# Patient Record
Sex: MALE | Race: OTHER | NOT HISPANIC OR LATINO | URBAN - METROPOLITAN AREA
[De-identification: names, ages, dates, MRNs, and addresses within clinical notes are randomized per-mention and may not be internally consistent; named-entity substitution may affect disease eponyms.]

---
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
## 2021-04-14 ENCOUNTER — APPOINTMENT (OUTPATIENT)
Dept: INTERNAL MEDICINE | Facility: CLINIC | Age: 21
End: 2021-04-14

## 2021-05-08 NOTE — ED PEDIATRIC NURSE NOTE - CHIEF COMPLAINT
Interventional Radiology Progress Note    Patient: Brooks Good Date: 2021  : 1956 Attending: MD Yosvany Bennett MD   64 year old male   Diagnosis/Comorbidities/Complications:  Patient Active Problem List   Diagnosis   • S/P MVR (mitral valve replacement)   • S/P TVR (tricuspid valve repair)   • S/P CABG x 3   • Essential (primary) hypertension   • Atrial fibrillation, paroxysmal   • CAD (coronary artery disease)   • Xarelto anticoagulation   • Cardiomyopathy, ischemic (CMS/HCC)   • Elevated troponin   • Abnormal stress test   • Acute systolic CHF (congestive heart failure) (CMS/HCC)       History: 64 year old male with CAD s/p 21 CABG Mitral valve replacement, tricuspid repair PFO closure on DOPT, HTN, hepatic steatosis, admitted through the ED 21 with chest pain, fatigue. Post admission patient found to have elevated LFT, Tbili, HIDA scan demonstrated hepatocellular dysfunction, MRCP without obstruction, positive for biliary sludge. IR consulted for cholecystostomy drain placement.     Reason for Follow -up :    -- 21 PTC 10 fr drain placed (Dr. Cabrera)     Subjective: Draining over night. Color is dark brown and no longer bloody per patient. He is feeling better but continues to have abdominal pain.      Physical Exam:  General: NAD. Appears stated age. Well developed, well nourished   Neuro: AAOx3. Moves all extremities spontaneously.   HEENT: Atraumatic, normocephalic.   Chest: Breathing comfortably on RA. Appears unlabored.  Abdomen: Soft, rounded. NT/ND. Mild generalized tenderness. PTC to gravity bag, dark brown drainage. Gauze dressing C/D/I no drainage from insertion site.   Extremities: Warm, well perfused.  Skin: Warm, dry, intact.      Vital Last Value 24 Hour Range   Temperature 99.1 °F (37.3 °C) (21 05) Temp  Min: 98.5 °F (36.9 °C)  Max: 99.1 °F (37.3 °C)   Pulse 74 (21 0511) Pulse  Min: 74  Max: 86   Respiratory 20 (21) Resp  Min: 18   Max: 20   Non-Invasive  Blood Pressure 95/60 (05/08/21 0511) BP  Min: 95/60  Max: 105/60   Arterial  Blood Pressure   No data recorded   Pulse Oximetry 97 % (05/08/21 0511) SpO2  Min: 95 %  Max: 97 %     Laboratory Results:  Recent Labs     05/06/21  0530 05/07/21  0531 05/07/21  0916 05/07/21  1841 05/08/21  0501 05/08/21  0608   SODIUM 137  --   --   --   --   --    POTASSIUM 3.7 4.0  --   --  4.1  --    BUN 10  --   --   --   --   --    CREATININE 0.82  --   --   --   --   --    WBC 3.3*  --   --   --   --  6.1   HCT 31.3*  --  31.3* 31.5*  --  29.4*   HGB 10.1*  --  10.0* 9.8*  --  9.2*   *  --   --   --   --  125*   GLUCOSE 82  --   --   --   --   --    BILIRUBIN 1.8* 1.6*  --   --  1.1*  --      Above labs were reviewed.    Scheduled Medications:  Current Facility-Administered Medications   Medication Dose Route Frequency Provider Last Rate Last Admin   • [Held by provider] sodium chloride 0.9% infusion   Intravenous Continuous Yosvany Winkler MD   Stopped at 05/07/21 0852   • HYDROmorphone (DILAUDID) injection 0.6 mg  0.6 mg Intravenous Q2H PRN Yosvany Winkler MD       • oxyCODONE-acetaminophen (PERCOCET) 5-325 MG tablet 1 tablet  1 tablet Oral Q4H PRN Trudy Estrada, COLIN   1 tablet at 05/08/21 0530   • aspirin chewable 81 mg  81 mg Oral Daily Roc Lehman MD   81 mg at 05/07/21 0857   • atorvastatin (LIPITOR) tablet 80 mg  80 mg Oral Daily Roc Lehman MD   80 mg at 05/07/21 0857   • clopidogrel (PLAVIX) tablet 75 mg  75 mg Oral Daily Roc Lehman MD   75 mg at 05/07/21 0857   • [Held by provider] sacubitril-valsartan (ENTRESTO) 24-26 MG per tablet 1 tablet  1 tablet Oral BID Trudy Estrada NP   1 tablet at 05/03/21 0820   • metoPROLOL succinate (TOPROL-XL) ER tablet 25 mg  25 mg Oral BID Trudy Estrada NP   25 mg at 05/07/21 5624   • nitroGLYcerin (NITROSTAT) sublingual tablet 0.4 mg  0.4 mg Sublingual Q5 Min PRN Roc Lehman MD       • [Held by provider] rivaroxaban  (XARELTO) tablet 20 mg  20 mg Oral Daily Roc Lehman MD   20 mg at 05/03/21 0820   • sodium chloride (NORMAL SALINE) 0.9 % bolus 500 mL  500 mL Intravenous PRN Rco Lehman MD   Stopped at 05/03/21 1812   • sodium chloride 0.9 % flush bag 25 mL  25 mL Intravenous PRN Roc Lehman MD       • Potassium Standard Replacement Protocol   Does not apply See Admin Instructions Roc Lehman MD       • Magnesium Standard Replacement Protocol   Does not apply See Admin Instructions Roc Lehman MD       • ondansetron (ZOFRAN) injection 4 mg  4 mg Intravenous BID PRN Roc Lehman MD   4 mg at 05/06/21 1958   • prochlorperazine (COMPAZINE) injection 5 mg  5 mg Intravenous Q4H PRN Roc Lehman MD       • acetaminophen (TYLENOL) tablet 650 mg  650 mg Oral Q4H PRN Roc Lehman MD   650 mg at 05/05/21 2156   • polyethylene glycol (MIRALAX) packet 17 g  17 g Oral Daily PRN Roc Lehman MD       • bisacodyl (DULCOLAX) suppository 10 mg  10 mg Rectal Daily PRN Roc Lehman MD       • nitroGLYcerin topical (NITRO-BID) 2 % ointment 1 inch  1 inch Topical Q6H PRN Fransico Holland MD   1 inch at 05/03/21 0100   • ranolazine (RANEXA) 12 hr tablet 500 mg  500 mg Oral BID Ebony Noble PA-C   500 mg at 05/07/21 2143   • sodium chloride (NORMAL SALINE) 0.9 % bolus 250 mL  250 mL Intravenous Once Trudy Estrada NP       • piperacillin-tazobactam (ZOSYN) 3.375 g in sodium chloride 0.9 % 100 mL IVPB  3.375 g Intravenous 3 times per day Yosvany Winkler MD 25 mL/hr at 05/08/21 0535 3.375 g at 05/08/21 0535   • pantoprazole (PROTONIX) EC tablet 40 mg  40 mg Oral QAM AC Trudy Estrada NP   40 mg at 05/08/21 0527   • sodium chloride (PF) 0.9 % injection 2 mL  2 mL Intracatheter 2 times per day Roc Lehman MD   2 mL at 05/07/21 2148       Continuous Infusions:  • [Held by provider] sodium chloride 0.9% infusion Stopped (05/07/21 0852)       Intake/Output:  Date 05/07/21 0700 - 05/08/21 0659 05/08/21 0700 -  05/09/21 0659   Shift 8020-3829 0013-8225 1325-1579 24 Hour Total 6183-0331 1814-1292 1198-3947 24 Hour Total   INTAKE   P.O.   100 100       Shift Total   100 100       OUTPUT   Urine  450  450       Drains 150 225 50 425         Output (ml) (Drain 05/06/21 #1 Right Abdomen Bulb (e.g. MOLLY, Davol, etc)) 150 225 50 425       Shift Total 150 675 50 875       Weight (kg) 90.5 90.5 91.6 91.6 91.6 91.6 91.6 91.6       Imaging:  NM hepatobiliary 5/5/21    IMPRESSION:   1. Hepatocellular dysfunction with reduced and delayed radiotracer  excretion.     2. The common bile duct is patent.     3. The gallbladder is not visualized. This raises the possibility of cystic  duct obstruction but the specificity of this finding is reduced given the  limited radiotracer excretion by the liver. Additional delayed images could  be performed at 1500 today or tomorrow morning to increase the confidence  of this finding, if necessary. Please let nuclear medicine know if this is  desired.      Plan/Recommendations:  64 year old male with complex cardiac history as noted in HPI found to have acute cholecystitis s/p IR PTC drain placed 5/6/21   -- T.Bili 1.1, Alk phos 178   -- Maintain drain to gravity drainage, document outputs every 8 hours, do not flush, dressing changes daily or as needed.    -- Plan for repeat cholangiogram possible capping trial in 6 weeks, OP orders placed    -- General surgery following- no current surgical plan, high risk d/t DOPT    -- Consider restarting Xarelto 48 hrs post placement       PATRICK Azar  Vascular & Interventional Radiology  [phone]      Please call [phone] or contact the on-call IR provider for urgent issues outside of above hours.     The patient is a 16y Male complaining of vomiting.

## 2021-05-26 ENCOUNTER — APPOINTMENT (OUTPATIENT)
Dept: INTERNAL MEDICINE | Facility: CLINIC | Age: 21
End: 2021-05-26
Payer: MEDICAID

## 2021-05-26 VITALS
DIASTOLIC BLOOD PRESSURE: 72 MMHG | TEMPERATURE: 98.2 F | BODY MASS INDEX: 23.66 KG/M2 | HEIGHT: 65 IN | WEIGHT: 142 LBS | SYSTOLIC BLOOD PRESSURE: 110 MMHG | HEART RATE: 72 BPM | RESPIRATION RATE: 15 BRPM | OXYGEN SATURATION: 98 %

## 2021-05-26 DIAGNOSIS — E55.9 VITAMIN D DEFICIENCY, UNSPECIFIED: ICD-10-CM

## 2021-05-26 DIAGNOSIS — Z13.29 ENCOUNTER FOR SCREENING FOR OTHER SUSPECTED ENDOCRINE DISORDER: ICD-10-CM

## 2021-05-26 DIAGNOSIS — Z11.4 ENCOUNTER FOR SCREENING FOR HUMAN IMMUNODEFICIENCY VIRUS [HIV]: ICD-10-CM

## 2021-05-26 DIAGNOSIS — Z00.00 ENCOUNTER FOR GENERAL ADULT MEDICAL EXAMINATION W/OUT ABNORMAL FINDINGS: ICD-10-CM

## 2021-05-26 DIAGNOSIS — Z13.220 ENCOUNTER FOR SCREENING FOR LIPOID DISORDERS: ICD-10-CM

## 2021-05-26 DIAGNOSIS — R63.0 ANOREXIA: ICD-10-CM

## 2021-05-26 DIAGNOSIS — R05 COUGH: ICD-10-CM

## 2021-05-26 DIAGNOSIS — Z87.09 PERSONAL HISTORY OF OTHER DISEASES OF THE RESPIRATORY SYSTEM: ICD-10-CM

## 2021-05-26 DIAGNOSIS — Z13.1 ENCOUNTER FOR SCREENING FOR DIABETES MELLITUS: ICD-10-CM

## 2021-05-26 DIAGNOSIS — Z13.31 ENCOUNTER FOR SCREENING FOR DEPRESSION: ICD-10-CM

## 2021-05-26 DIAGNOSIS — Z83.79 FAMILY HISTORY OF OTHER DISEASES OF THE DIGESTIVE SYSTEM: ICD-10-CM

## 2021-05-26 DIAGNOSIS — Z78.9 OTHER SPECIFIED HEALTH STATUS: ICD-10-CM

## 2021-05-26 DIAGNOSIS — R06.2 WHEEZING: ICD-10-CM

## 2021-05-26 PROCEDURE — 99395 PREV VISIT EST AGE 18-39: CPT | Mod: 25

## 2021-05-26 PROCEDURE — 96127 BRIEF EMOTIONAL/BEHAV ASSMT: CPT

## 2021-05-26 RX ORDER — ALBUTEROL SULFATE 90 UG/1
108 (90 BASE) INHALANT RESPIRATORY (INHALATION)
Qty: 1 | Refills: 0 | Status: ACTIVE | COMMUNITY
Start: 2021-05-26 | End: 1900-01-01

## 2021-05-26 NOTE — ASSESSMENT
[FreeTextEntry1] : \par Intermittent cough/Wheezing\par -he may have mild asthma vs seasonal allergies\par -he will continue to use claritin 10mg daily prn\par -will add albuterol inhaler for prn use\par -will check CXR\par -will order labs\par -if sx persist he should notify office\par -if sx persist may benefit from seeing pulmonary for form PFts\par \par S/P covid 19\par -will check labs\par \par Vitamin D deficiency:\par -labs ordered today\par \par HCM:\par \par CPE 2021\par \par Depression screenin2021\par \par Flu shot: he did not get this past flu season\par \par Tdap: 2012\par \par Hepatitis A vaccine: completed series\par \par Hepatitis B vaccine: completed series\par \par HPV vaccine: completed series\par \par Covid vaccine advised\par \par HIV testing: offered 2021-he declined\par \par F/U 3 months for cough f/u.  Fasting labs drawn in office today\par

## 2021-05-26 NOTE — HEALTH RISK ASSESSMENT
[Yes] : In the past 12 months have you used drugs other than those required for medical reasons? Yes [0] : 2) Feeling down, depressed, or hopeless: Not at all (0) [HIV test declined] : HIV test declined [Employed] : employed [] : No [de-identified] : occasionally [de-identified] : marijuana [[id number]] : 0 [FreeTextEntry2] :

## 2021-05-26 NOTE — PHYSICAL EXAM
[No Acute Distress] : no acute distress [Well Nourished] : well nourished [Well Developed] : well developed [Well-Appearing] : well-appearing [Normal Voice/Communication] : normal voice/communication [Normal Sclera/Conjunctiva] : normal sclera/conjunctiva [PERRL] : pupils equal round and reactive to light [EOMI] : extraocular movements intact [Normal Outer Ear/Nose] : the outer ears and nose were normal in appearance [Normal Oropharynx] : the oropharynx was normal [Normal TMs] : both tympanic membranes were normal [Normal Nasal Mucosa] : the nasal mucosa was normal [No JVD] : no jugular venous distention [No Lymphadenopathy] : no lymphadenopathy [Supple] : supple [Thyroid Normal, No Nodules] : the thyroid was normal and there were no nodules present [No Respiratory Distress] : no respiratory distress  [No Accessory Muscle Use] : no accessory muscle use [Clear to Auscultation] : lungs were clear to auscultation bilaterally [Normal Rate] : normal rate  [Regular Rhythm] : with a regular rhythm [Normal S1, S2] : normal S1 and S2 [No Murmur] : no murmur heard [No Carotid Bruits] : no carotid bruits [No Edema] : there was no peripheral edema [No Extremity Clubbing/Cyanosis] : no extremity clubbing/cyanosis [Soft] : abdomen soft [Non Tender] : non-tender [Non-distended] : non-distended [No Masses] : no abdominal mass palpated [No HSM] : no HSM [Normal Bowel Sounds] : normal bowel sounds [No CVA Tenderness] : no CVA  tenderness [No Spinal Tenderness] : no spinal tenderness [No Joint Swelling] : no joint swelling [No Rash] : no rash [No Skin Lesions] : no skin lesions [No Focal Deficits] : no focal deficits [Normal Gait] : normal gait [Normal Affect] : the affect was normal [Alert and Oriented x3] : oriented to person, place, and time [Normal Mood] : the mood was normal [Normal Insight/Judgement] : insight and judgment were intact

## 2021-05-26 NOTE — HISTORY OF PRESENT ILLNESS
[de-identified] : \par Here for CPE\par \par He was previously followed by Dr Bazan\par \par He states he would like check up because he had covid 19 about 5 months ago.  he states sx were not severe.  he states ever since he had covid he gets an occasional mild dry cough.  he states sx lasts for about an hour and then go away.  he does have seasonal allergies and takes claritin every day.  he states he had asthma as a child.  he states when he gets coughing fits he does get wheezing from time to time

## 2021-05-26 NOTE — REVIEW OF SYSTEMS
[Wheezing] : wheezing [Cough] : cough [Negative] : Psychiatric [Fever] : no fever [Chills] : no chills [Fatigue] : no fatigue [Recent Change In Weight] : ~T no recent weight change [Palpitations] : no palpitations [Lower Ext Edema] : no lower extremity edema [Shortness Of Breath] : no shortness of breath [Dyspnea on Exertion] : not dyspnea on exertion [Abdominal Pain] : no abdominal pain [Nausea] : no nausea [Diarrhea] : no diarrhea [Vomiting] : no vomiting [Anxiety] : no anxiety [Depression] : no depression

## 2021-05-27 LAB
25(OH)D3 SERPL-MCNC: 24 NG/ML
ALBUMIN SERPL ELPH-MCNC: 5.3 G/DL
ALP BLD-CCNC: 96 U/L
ALT SERPL-CCNC: 11 U/L
ANION GAP SERPL CALC-SCNC: 12 MMOL/L
APPEARANCE: CLEAR
AST SERPL-CCNC: 17 U/L
BACTERIA: NEGATIVE
BASOPHILS # BLD AUTO: 0.06 K/UL
BASOPHILS NFR BLD AUTO: 1.2 %
BILIRUB SERPL-MCNC: 0.8 MG/DL
BILIRUBIN URINE: NEGATIVE
BLOOD URINE: NEGATIVE
BUN SERPL-MCNC: 11 MG/DL
CALCIUM SERPL-MCNC: 10 MG/DL
CHLORIDE SERPL-SCNC: 103 MMOL/L
CHOLEST SERPL-MCNC: 144 MG/DL
CO2 SERPL-SCNC: 27 MMOL/L
COLOR: YELLOW
CREAT SERPL-MCNC: 0.73 MG/DL
EOSINOPHIL # BLD AUTO: 0.09 K/UL
EOSINOPHIL NFR BLD AUTO: 1.8 %
ERYTHROCYTE [SEDIMENTATION RATE] IN BLOOD BY WESTERGREN METHOD: 3 MM/HR
ESTIMATED AVERAGE GLUCOSE: 105 MG/DL
GLUCOSE QUALITATIVE U: NEGATIVE
GLUCOSE SERPL-MCNC: 83 MG/DL
HBA1C MFR BLD HPLC: 5.3 %
HCT VFR BLD CALC: 49.2 %
HDLC SERPL-MCNC: 85 MG/DL
HGB BLD-MCNC: 15.3 G/DL
HYALINE CASTS: 2 /LPF
IMM GRANULOCYTES NFR BLD AUTO: 0.2 %
KETONES URINE: NEGATIVE
LDLC SERPL CALC-MCNC: 52 MG/DL
LEUKOCYTE ESTERASE URINE: NEGATIVE
LYMPHOCYTES # BLD AUTO: 1.55 K/UL
LYMPHOCYTES NFR BLD AUTO: 30.5 %
MAN DIFF?: NORMAL
MCHC RBC-ENTMCNC: 27.1 PG
MCHC RBC-ENTMCNC: 31.1 GM/DL
MCV RBC AUTO: 87.2 FL
MICROSCOPIC-UA: NORMAL
MONOCYTES # BLD AUTO: 0.34 K/UL
MONOCYTES NFR BLD AUTO: 6.7 %
NEUTROPHILS # BLD AUTO: 3.03 K/UL
NEUTROPHILS NFR BLD AUTO: 59.6 %
NITRITE URINE: NEGATIVE
NONHDLC SERPL-MCNC: 59 MG/DL
PH URINE: 6.5
PLATELET # BLD AUTO: 232 K/UL
POTASSIUM SERPL-SCNC: 4.6 MMOL/L
PROT SERPL-MCNC: 7.9 G/DL
PROTEIN URINE: NEGATIVE
RBC # BLD: 5.64 M/UL
RBC # FLD: 13.6 %
RED BLOOD CELLS URINE: 1 /HPF
SODIUM SERPL-SCNC: 141 MMOL/L
SPECIFIC GRAVITY URINE: 1.02
SQUAMOUS EPITHELIAL CELLS: 0 /HPF
T4 FREE SERPL-MCNC: 1.5 NG/DL
TRIGL SERPL-MCNC: 34 MG/DL
TSH SERPL-ACNC: 0.59 UIU/ML
UROBILINOGEN URINE: NORMAL
WBC # FLD AUTO: 5.08 K/UL
WHITE BLOOD CELLS URINE: 0 /HPF

## 2021-06-04 ENCOUNTER — NON-APPOINTMENT (OUTPATIENT)
Age: 21
End: 2021-06-04

## 2021-06-21 ENCOUNTER — NON-APPOINTMENT (OUTPATIENT)
Age: 21
End: 2021-06-21

## 2021-06-24 ENCOUNTER — INPATIENT (INPATIENT)
Facility: HOSPITAL | Age: 21
LOS: 0 days | Discharge: LEFT BEFORE DISCHARGE | End: 2021-06-25
Attending: STUDENT IN AN ORGANIZED HEALTH CARE EDUCATION/TRAINING PROGRAM | Admitting: STUDENT IN AN ORGANIZED HEALTH CARE EDUCATION/TRAINING PROGRAM
Payer: MEDICAID

## 2021-06-24 ENCOUNTER — EMERGENCY (EMERGENCY)
Facility: HOSPITAL | Age: 21
LOS: 1 days | Discharge: HOME | End: 2021-06-24
Attending: EMERGENCY MEDICINE | Admitting: EMERGENCY MEDICINE
Payer: MEDICAID

## 2021-06-24 VITALS
TEMPERATURE: 99 F | OXYGEN SATURATION: 100 % | SYSTOLIC BLOOD PRESSURE: 118 MMHG | HEART RATE: 74 BPM | DIASTOLIC BLOOD PRESSURE: 82 MMHG | RESPIRATION RATE: 16 BRPM

## 2021-06-24 VITALS
OXYGEN SATURATION: 99 % | SYSTOLIC BLOOD PRESSURE: 129 MMHG | TEMPERATURE: 98 F | RESPIRATION RATE: 16 BRPM | DIASTOLIC BLOOD PRESSURE: 85 MMHG | HEART RATE: 79 BPM

## 2021-06-24 VITALS
OXYGEN SATURATION: 100 % | SYSTOLIC BLOOD PRESSURE: 127 MMHG | TEMPERATURE: 98 F | DIASTOLIC BLOOD PRESSURE: 77 MMHG | HEART RATE: 88 BPM | RESPIRATION RATE: 18 BRPM

## 2021-06-24 LAB
ALBUMIN SERPL ELPH-MCNC: 5 G/DL — SIGNIFICANT CHANGE UP (ref 3.3–5)
ALP SERPL-CCNC: 73 U/L — SIGNIFICANT CHANGE UP (ref 40–120)
ALT FLD-CCNC: 12 U/L — SIGNIFICANT CHANGE UP (ref 4–41)
ANION GAP SERPL CALC-SCNC: 16 MMOL/L — HIGH (ref 7–14)
APPEARANCE UR: CLEAR — SIGNIFICANT CHANGE UP
AST SERPL-CCNC: 15 U/L — SIGNIFICANT CHANGE UP (ref 4–40)
BASOPHILS # BLD AUTO: 0.02 K/UL — SIGNIFICANT CHANGE UP (ref 0–0.2)
BASOPHILS NFR BLD AUTO: 0.2 % — SIGNIFICANT CHANGE UP (ref 0–2)
BILIRUB SERPL-MCNC: 0.8 MG/DL — SIGNIFICANT CHANGE UP (ref 0.2–1.2)
BILIRUB UR-MCNC: NEGATIVE — SIGNIFICANT CHANGE UP
BUN SERPL-MCNC: 10 MG/DL — SIGNIFICANT CHANGE UP (ref 7–23)
CALCIUM SERPL-MCNC: 9.5 MG/DL — SIGNIFICANT CHANGE UP (ref 8.4–10.5)
CHLORIDE SERPL-SCNC: 103 MMOL/L — SIGNIFICANT CHANGE UP (ref 98–107)
CO2 SERPL-SCNC: 23 MMOL/L — SIGNIFICANT CHANGE UP (ref 22–31)
COLOR SPEC: SIGNIFICANT CHANGE UP
CREAT SERPL-MCNC: 0.7 MG/DL — SIGNIFICANT CHANGE UP (ref 0.5–1.3)
DIFF PNL FLD: NEGATIVE — SIGNIFICANT CHANGE UP
EOSINOPHIL # BLD AUTO: 0 K/UL — SIGNIFICANT CHANGE UP (ref 0–0.5)
EOSINOPHIL NFR BLD AUTO: 0 % — SIGNIFICANT CHANGE UP (ref 0–6)
GLUCOSE SERPL-MCNC: 99 MG/DL — SIGNIFICANT CHANGE UP (ref 70–99)
GLUCOSE UR QL: NEGATIVE — SIGNIFICANT CHANGE UP
HCT VFR BLD CALC: 40.8 % — SIGNIFICANT CHANGE UP (ref 39–50)
HGB BLD-MCNC: 13.9 G/DL — SIGNIFICANT CHANGE UP (ref 13–17)
IANC: 7.73 K/UL — SIGNIFICANT CHANGE UP (ref 1.5–8.5)
IMM GRANULOCYTES NFR BLD AUTO: 0.2 % — SIGNIFICANT CHANGE UP (ref 0–1.5)
KETONES UR-MCNC: ABNORMAL
LEUKOCYTE ESTERASE UR-ACNC: NEGATIVE — SIGNIFICANT CHANGE UP
LIDOCAIN IGE QN: 40 U/L — SIGNIFICANT CHANGE UP (ref 7–60)
LYMPHOCYTES # BLD AUTO: 0.82 K/UL — LOW (ref 1–3.3)
LYMPHOCYTES # BLD AUTO: 9.1 % — LOW (ref 13–44)
MCHC RBC-ENTMCNC: 28 PG — SIGNIFICANT CHANGE UP (ref 27–34)
MCHC RBC-ENTMCNC: 34.1 GM/DL — SIGNIFICANT CHANGE UP (ref 32–36)
MCV RBC AUTO: 82.3 FL — SIGNIFICANT CHANGE UP (ref 80–100)
MONOCYTES # BLD AUTO: 0.42 K/UL — SIGNIFICANT CHANGE UP (ref 0–0.9)
MONOCYTES NFR BLD AUTO: 4.7 % — SIGNIFICANT CHANGE UP (ref 2–14)
NEUTROPHILS # BLD AUTO: 7.73 K/UL — HIGH (ref 1.8–7.4)
NEUTROPHILS NFR BLD AUTO: 85.8 % — HIGH (ref 43–77)
NITRITE UR-MCNC: NEGATIVE — SIGNIFICANT CHANGE UP
NRBC # BLD: 0 /100 WBCS — SIGNIFICANT CHANGE UP
NRBC # FLD: 0 K/UL — SIGNIFICANT CHANGE UP
PH UR: 7.5 — SIGNIFICANT CHANGE UP (ref 5–8)
PLATELET # BLD AUTO: 293 K/UL — SIGNIFICANT CHANGE UP (ref 150–400)
POTASSIUM SERPL-MCNC: 3.6 MMOL/L — SIGNIFICANT CHANGE UP (ref 3.5–5.3)
POTASSIUM SERPL-SCNC: 3.6 MMOL/L — SIGNIFICANT CHANGE UP (ref 3.5–5.3)
PROT SERPL-MCNC: 7.7 G/DL — SIGNIFICANT CHANGE UP (ref 6–8.3)
PROT UR-MCNC: ABNORMAL
RBC # BLD: 4.96 M/UL — SIGNIFICANT CHANGE UP (ref 4.2–5.8)
RBC # FLD: 12.9 % — SIGNIFICANT CHANGE UP (ref 10.3–14.5)
SODIUM SERPL-SCNC: 142 MMOL/L — SIGNIFICANT CHANGE UP (ref 135–145)
SP GR SPEC: >1.05 (ref 1.01–1.02)
UROBILINOGEN FLD QL: SIGNIFICANT CHANGE UP
WBC # BLD: 9.01 K/UL — SIGNIFICANT CHANGE UP (ref 3.8–10.5)
WBC # FLD AUTO: 9.01 K/UL — SIGNIFICANT CHANGE UP (ref 3.8–10.5)

## 2021-06-24 PROCEDURE — 99284 EMERGENCY DEPT VISIT MOD MDM: CPT

## 2021-06-24 PROCEDURE — 74177 CT ABD & PELVIS W/CONTRAST: CPT | Mod: 26

## 2021-06-24 RX ORDER — ACETAMINOPHEN 500 MG
975 TABLET ORAL ONCE
Refills: 0 | Status: COMPLETED | OUTPATIENT
Start: 2021-06-24 | End: 2021-06-24

## 2021-06-24 RX ORDER — SODIUM CHLORIDE 9 MG/ML
1000 INJECTION, SOLUTION INTRAVENOUS
Refills: 0 | Status: DISCONTINUED | OUTPATIENT
Start: 2021-06-24 | End: 2021-06-25

## 2021-06-24 RX ORDER — FAMOTIDINE 10 MG/ML
20 INJECTION INTRAVENOUS DAILY
Refills: 0 | Status: DISCONTINUED | OUTPATIENT
Start: 2021-06-24 | End: 2021-06-24

## 2021-06-24 RX ORDER — HALOPERIDOL DECANOATE 100 MG/ML
5 INJECTION INTRAMUSCULAR ONCE
Refills: 0 | Status: COMPLETED | OUTPATIENT
Start: 2021-06-24 | End: 2021-06-24

## 2021-06-24 RX ORDER — ONDANSETRON 8 MG/1
4 TABLET, FILM COATED ORAL ONCE
Refills: 0 | Status: COMPLETED | OUTPATIENT
Start: 2021-06-24 | End: 2021-06-24

## 2021-06-24 RX ORDER — SODIUM CHLORIDE 9 MG/ML
1000 INJECTION INTRAMUSCULAR; INTRAVENOUS; SUBCUTANEOUS ONCE
Refills: 0 | Status: COMPLETED | OUTPATIENT
Start: 2021-06-24 | End: 2021-06-24

## 2021-06-24 RX ORDER — ONDANSETRON 8 MG/1
4 TABLET, FILM COATED ORAL ONCE
Refills: 0 | Status: DISCONTINUED | OUTPATIENT
Start: 2021-06-24 | End: 2021-06-24

## 2021-06-24 RX ORDER — FAMOTIDINE 10 MG/ML
20 INJECTION INTRAVENOUS ONCE
Refills: 0 | Status: COMPLETED | OUTPATIENT
Start: 2021-06-24 | End: 2021-06-24

## 2021-06-24 RX ADMIN — SODIUM CHLORIDE 1000 MILLILITER(S): 9 INJECTION INTRAMUSCULAR; INTRAVENOUS; SUBCUTANEOUS at 18:38

## 2021-06-24 RX ADMIN — HALOPERIDOL DECANOATE 5 MILLIGRAM(S): 100 INJECTION INTRAMUSCULAR at 23:58

## 2021-06-24 RX ADMIN — Medication 975 MILLIGRAM(S): at 18:38

## 2021-06-24 RX ADMIN — FAMOTIDINE 20 MILLIGRAM(S): 10 INJECTION INTRAVENOUS at 23:59

## 2021-06-24 RX ADMIN — ONDANSETRON 4 MILLIGRAM(S): 8 TABLET, FILM COATED ORAL at 18:38

## 2021-06-24 NOTE — ED ADULT NURSE NOTE - NSIMPLEMENTINTERV_GEN_ALL_ED
Implemented All Universal Safety Interventions:  [place] to call system. Call bell, personal items and telephone within reach. Instruct patient to call for assistance. Room bathroom lighting operational. Non-slip footwear when patient is off stretcher. Physically safe environment: no spills, clutter or unnecessary equipment. Stretcher in lowest position, wheels locked, appropriate side rails in place.

## 2021-06-24 NOTE — ED PROVIDER NOTE - CARE PLAN
Principal Discharge DX:	Abdominal pain   Principal Discharge DX:	Abdominal pain  Secondary Diagnosis:	Dehydration

## 2021-06-24 NOTE — ED ADULT NURSE NOTE - OBJECTIVE STATEMENT
pt received at intake rm 10a AAO x 3. pt reports abdominal pain, n/v x few days. pt states [facility] and discharged with negative results. pt denies sob, chest pain, diarrhea, fevers, chills, cough. respirations even and unlabored. 20g iv placed to right ac. will continue to monitor.

## 2021-06-24 NOTE — ED PROVIDER NOTE - PATIENT PORTAL LINK FT
You can access the FollowMyHealth Patient Portal offered by [facility] by registering at the following website: [email or web address]. By joining [employer]’s FollowMyHealth portal, you will also be able to view your health information using other applications (apps) compatible with our system.

## 2021-06-24 NOTE — ED PROVIDER NOTE - PROGRESS NOTE DETAILS
pt is insisting on leaving [facility].  I spoke to him and his mother and he is still insisting on leaving.  right now his abd is soft no r/g and he is not vomiting.

## 2021-06-24 NOTE — ED PROVIDER NOTE - OBJECTIVE STATEMENT
22 Y/O M denies PMH states that he has been having nausea and vomiting since Sunday. Pt denies diarrhea, states he last vomited 2 hours prior to ED arrival. Pt states he had an endoscopy years ago which he states was normal. Pt states he has abdominal pain (worse in epigastrium but also present in RLQ) which is 5/10. Pt states he went to [facility] yesterday and had a CT (normal as per charge RN at [facility]) and was found to have a potassium of 3.3 and was D/C'd with GI follow up. Spoke to pt's mother who states she is concerned pt may be inducing vomiting, states pt has induced vomiting before and has lost a significant amount of weight. Pt states he drank alcohol on Sunday but does not drink regularly (6 shots of liquor). Pt is an occasional Marijuana smoker. Pt denies any other sx or acute complaints.

## 2021-06-24 NOTE — ED PROVIDER NOTE - ATTENDING CONTRIBUTION TO CARE
sharath: pt seen earlier tonight for intractable cyclic vomiting.  says he has a normal scope a few years ago, no answer found ct done yesterday at [facility], no abnl findings.  pt last smoked pot as per him 2 weeks ago.  now with gagging and vomiting in the ED.  pt had been dc, but ate in abp and got nauseated and returned.  abd soft no r/g/t.  pt offered admission.  pt has gi appt tomorrow but doesn't want to wait.  hydrating and giving antiemetics    I performed a history and physical exam of the patient and discussed their management with the resident and /or advanced care provider. I reviewed the resident and /or ACP's note and agree with the documented findings and plan of care. My medical decison making and observations are found above.

## 2021-06-24 NOTE — ED PROVIDER NOTE - NSFOLLOWUPINSTRUCTIONS_ED_ALL_ED_FT
Follow up with your Gastroenterologist as scheduled. If you do not have a Gastroenterologist call  to schedule an appointment.  Advance activity as tolerated.  Continue all previously prescribed medications as directed.  Follow up with your primary care physician in 48-72 hours- bring copies of your results.  Return to the ER for worsening or persistent symptoms, and/or ANY NEW OR CONCERNING SYMPTOMS. If you have issues obtaining follow up, please call: [phone] to obtain a doctor or specialist who takes your insurance in your area.  You may call [phone] to make an appointment with the internal medicine clinic.

## 2021-06-24 NOTE — ED ADULT NURSE NOTE - CHIEF COMPLAINT QUOTE
Pt with abdominal pain nausea and vomiting x few days was seen at [facility] and had negative ct. and negative blood work. pt returns again today for pain.

## 2021-06-24 NOTE — ED PROVIDER NOTE - CLINICAL SUMMARY MEDICAL DECISION MAKING FREE TEXT BOX
20 Y/O M denies PMH states that he has been having nausea and vomiting since Sunday. Pt denies diarrhea, states he last vomited 2 hours prior to ED arrival. Pt states he had an endoscopy years ago which he states was normal. Pt states he has abdominal pain (worse in epigastrium but also present in RLQ) which is 5/10. Plan is CT to eval for appendicitis or other acute abdominal findings. Labs ordered to eval for anemia or electrolyte disturbance. Pt is well appearing, no acute distress. 22 Y/O M denies PMH states that he has been having nausea and vomiting since Sunday. Pt denies diarrhea, states he last vomited 2 hours prior to ED arrival. Pt states he had an endoscopy years ago which he states was normal. Pt states he has abdominal pain (worse in epigastrium but also present in RLQ) which is 5/10. Plan is CT to eval for appendicitis or other acute abdominal findings. Labs ordered to eval for anemia or electrolyte disturbance. Pt is well appearing, no acute distress.    haughey: pt with intractable cyclic vomiting.  says he has a normal scope a few years ago, no answer found ct done yesterday at [facility], no abnl findings.  pt last smoked pot as per him 2 weeks ago.  now with gagging and vomiting in the ED.  patient and mom both very upset  abd soft no r/g/t.  pt has gi appt tomorrow but doesn't want to wait.  hydrating and giving antiemetics.  pt able to tolerate po after treatment and dc

## 2021-06-24 NOTE — ED PROVIDER NOTE - CLINICAL SUMMARY MEDICAL DECISION MAKING FREE TEXT BOX
20 Y/O M denies PMH states that he has been having nausea and vomiting since Sunday. Pt denies diarrhea, states he last vomited 2 hours prior to ED arrival. Pt states he had an endoscopy years ago which he states was normal. Pt returned to the ER C/O recurrent nausea and severe pain. Pt concerned about going home, states he cannot keep liquids down. Will admit to medicine and consult GI for further eval. 20 Y/O M denies PMH states that he has been having nausea and vomiting since Sunday. Pt denies diarrhea, states he last vomited 2 hours prior to ED arrival. Pt states he had an endoscopy years ago which he states was normal. Pt returned to the ER C/O recurrent nausea and severe pain. Pt concerned about going home, states he cannot keep liquids down. Will admit to medicine and consult GI for further eval.    donovanughtorito: pt seen earlier tonight for intractable cyclic vomiting.  says he has a normal scope a few years ago, no answer found ct done yesterday at [facility], no abnl findings.  pt last smoked pot as per him 2 weeks ago.  now with gagging and vomiting in the ED.  pt had been dc, but ate in abp and got nauseated and returned.  abd soft no r/g/t.  pt offered admission.  pt has gi appt tomorrow but doesn't want to wait.  hydrating and giving antiemetics 20 Y/O M denies PMH states that he has been having nausea and vomiting since Sunday. Pt denies diarrhea, states he last vomited 2 hours prior to ED arrival. Pt states he had an endoscopy years ago which he states was normal. Pt returned to the ER C/O recurrent nausea and severe pain. Pt concerned about going home, states he cannot keep liquids down. Will admit to medicine and consult GI for further eval.    sharath: pt seen earlier tonight for intractable cyclic vomiting.  says he has a normal scope a few years ago, no answer found ct done yesterday at [facility], no abnl findings.  pt last smoked pot as per him 2 weeks ago.  now with gagging and vomiting in the ED.  pt had been dc, but ate in abp and got nauseated and returned.  abd soft no r/g/t.  pt offered admission.  pt has gi appt tomorrow but doesn't want to wait.  hydrating and giving antiemetics    JANET Edwards pt requesting to leave AMA  Patient is requesting to leave the hospital AMA.    The patient has decided to leave against medical advice.  It has been explained to the patient that leaving prior to completion of work up and treatment may result in recurrent or worsening of symptoms, severe permanent disability, pain and suffering, and/or even death.  The patient has demonstrated comprehension and verbalizes understanding of these risks.  The patient has been told that it is necessary to return to the ED immediately for persistent or recurring symptoms, worsening symptoms, or any concerning symptoms.  The patient has also been informed that they may return to the ER immediately at any time if they change their mind and wish to resume care. The patient has been given the opportunity to ask questions about their medical condition.

## 2021-06-24 NOTE — ED ADULT TRIAGE NOTE - CHIEF COMPLAINT QUOTE
pt seen in [facility] ED tonight for abdominal pain was discharged. Pt had turkey sandwich after discharge, pt reports pain starting again to the middle of the abdomen right after eating. pt also c/o nausea. denies any other complaints. appears to be in no distress.

## 2021-06-24 NOTE — ED PROVIDER NOTE - PROGRESS NOTE DETAILS
JANET Ruiz: Pt tolerated PO, feels better, will D/C with GI follow up, pt states he already has an appointment with GI tomorrow. Pt states he only induces vomiting when he feels abdominal pain. Pt states he is not trying to loose weight and denies psychiatric issues.

## 2021-06-24 NOTE — ED PROVIDER NOTE - OBJECTIVE STATEMENT
20 Y/O M denies PMH states that he has been having nausea and vomiting since Sunday. Pt denies diarrhea, states he last vomited 2 hours prior to ED arrival. Pt states he had an endoscopy years ago which he states was normal. Pt states he has abdominal pain (worse in epigastrium but also present in RLQ) which is 5/10. Pt states he went to [place] yesterday and had a CT (normal as per charge RN at [facility]) and was found to have a potassium of 3.3 and was D/C'd with GI follow up. Spoke to pt's mother who states she is concerned pt may be inducing vomiting, states pt has induced vomiting before and has lost a significant amount of weight. Pt states he drank alcohol on Sunday but does not drink regularly (6 shots of liquor). Pt is an occasional Marijuana smoker. Pt was discharged after tolerating PO in the ER. Was flagged by pt's mother stating the pt went to [place] and began to wretch again. Pt states he does not feel safe going home, cannot keep liquids down and states the pain is recurring. Pt denies any other SX or acute complaints. Spoke to pt and pt's mother and father with  [id number], all parties agreeable to medical admission and IV fluid for further eval.

## 2021-06-24 NOTE — ED PROVIDER NOTE - PATIENT PORTAL LINK FT
You can access the FollowMyHealth Patient Portal offered by [facility] by registering at the following website: [email or web address]. By joining [facility]’s FollowMyHealth portal, you will also be able to view your health information using other applications (apps) compatible with our system.

## 2021-06-24 NOTE — ED PROVIDER NOTE - ATTENDING CONTRIBUTION TO CARE
sharath: pt with intractable cyclic vomiting.  says he has a normal scope a few years ago, no answer found ct done yesterday at [facility], no abnl findings.  pt last smoked pot as per him 2 weeks ago.  now with gagging and vomiting in the ED.  patient and mom both very upset  abd soft no r/g/t.  pt has gi appt tomorrow but doesn't want to wait.  hydrating and giving antiemetics.  pt able to tolerate po after treatment and dc    I performed a history and physical exam of the patient and discussed their management with the resident and /or advanced care provider. I reviewed the resident and /or ACP's note and agree with the documented findings and plan of care. My medical decison making and observations are found above.

## 2021-06-25 DIAGNOSIS — R11.10 VOMITING, UNSPECIFIED: ICD-10-CM

## 2021-06-25 LAB — SARS-COV-2 RNA SPEC QL NAA+PROBE: SIGNIFICANT CHANGE UP

## 2021-06-25 NOTE — ED ADULT NURSE NOTE - CHIEF COMPLAINT QUOTE
pt seen in [facility] ED tonight for abdominal pain was discharged. Pt had turkey sandwich after discharge, pt reports pain starting again to the middle of the abdomen right after eating. pt also c/o nausea. denies any other complaints. appears to be in no distress.

## 2021-06-25 NOTE — ED ADULT NURSE NOTE - OBJECTIVE STATEMENT
Received patient form intake RN, A&OX4, Ambulatory. Respirations even and unlabored. Lung sounds clear with equal chest rise bilaterally. Patient denies any complaints

## 2021-06-26 LAB
CULTURE RESULTS: SIGNIFICANT CHANGE UP
SPECIMEN SOURCE: SIGNIFICANT CHANGE UP

## 2021-06-28 ENCOUNTER — APPOINTMENT (OUTPATIENT)
Dept: INTERNAL MEDICINE | Facility: CLINIC | Age: 21
End: 2021-06-28
Payer: MEDICAID

## 2021-06-28 VITALS
HEART RATE: 70 BPM | HEIGHT: 66 IN | TEMPERATURE: 98.2 F | RESPIRATION RATE: 15 BRPM | BODY MASS INDEX: 21.05 KG/M2 | DIASTOLIC BLOOD PRESSURE: 80 MMHG | WEIGHT: 131 LBS | SYSTOLIC BLOOD PRESSURE: 120 MMHG | OXYGEN SATURATION: 99 %

## 2021-06-28 DIAGNOSIS — K21.00 GASTRO-ESOPHAGEAL REFLUX DISEASE WITH ESOPHAGITIS, WITHOUT BLEEDING: ICD-10-CM

## 2021-06-28 DIAGNOSIS — R11.2 NAUSEA WITH VOMITING, UNSPECIFIED: ICD-10-CM

## 2021-06-28 PROCEDURE — 99214 OFFICE O/P EST MOD 30 MIN: CPT

## 2021-06-28 RX ORDER — OMEPRAZOLE 40 MG/1
40 CAPSULE, DELAYED RELEASE ORAL TWICE DAILY
Qty: 30 | Refills: 2 | Status: ACTIVE | COMMUNITY
Start: 2021-06-28

## 2021-06-28 RX ORDER — ONDANSETRON 4 MG/1
4 TABLET ORAL EVERY 8 HOURS
Qty: 15 | Refills: 0 | Status: ACTIVE | COMMUNITY
Start: 2021-06-28 | End: 1900-01-01

## 2021-06-28 RX ORDER — FAMOTIDINE 20 MG/1
20 TABLET, FILM COATED ORAL
Qty: 20 | Refills: 0 | Status: ACTIVE | COMMUNITY
Start: 2021-06-28 | End: 1900-01-01

## 2021-06-28 RX ORDER — SUCRALFATE 1 G/1
1 TABLET ORAL
Refills: 0 | Status: ACTIVE | COMMUNITY

## 2021-06-28 NOTE — HISTORY OF PRESENT ILLNESS
[FreeTextEntry8] : Mr. YOSHI LEE  is    21 year male presents today for a follow-up from ER visit.\par Patient stated he was perfectly normal 6 days ago.  On Father's Day he drinks some alcohol with his family.\par And after 3 days he experienced pain in the abdomen nausea vomiting not able to keep any food inside.\par He went to [facility] on the 23rd.  He had initial work-up CAT scan of abdomen and pelvis done all his test came unremarkable.  He came home was not feeling well the next day he went to [facility] and had another bunch of tests done along with a CAT scan which again came back normal.\par He also saw gastroenterologist at [place] last Friday on the 25th.\par He had some test done and was sent home with omeprazole 40 mg twice a day and sucralfate.\par He continues to have abdominal pain and vomiting.  His symptoms are more in the morning when he gets up.\par He has lost 10 pounds in last 1 week.\par He also complains of tingling sensation and cramps in his arms and legs.\par

## 2021-06-28 NOTE — ASSESSMENT
[FreeTextEntry1] : reviewed documents and labs from [facility].\par will get records from GI .\par He will continue omeprazole 40 mg bid and sucralfate 1 mg.\par I will prescribe Zofran 4 mg every 12 hours as needed for nausea and vomiting.\par Famotidine 20 mg at bedtime.\par BRAT diet.\par

## 2021-06-28 NOTE — PHYSICAL EXAM
[Soft] : abdomen soft [Non-distended] : non-distended [No Masses] : no abdominal mass palpated [No HSM] : no HSM [Normal Bowel Sounds] : normal bowel sounds [Normal] : normal gait, coordination grossly intact, no focal deficits and deep tendon reflexes were 2+ and symmetric [de-identified] : +mild tender in the epigastric area.

## 2023-08-14 NOTE — ED PROVIDER NOTE - NS ED MD EM SELECTION
Bed/Stretcher in lowest position, wheels locked, appropriate side rails in place/Call bell, personal items and telephone in reach/Instruct patient to call for assistance before getting out of bed/chair/stretcher/Non-slip footwear applied when patient is off stretcher/[place] to call system/Physically safe environment - no spills, clutter or unnecessary equipment/Purposeful proactive rounding/Room/bathroom lighting operational, light cord in reach
[id number] Detailed

## 2023-08-16 NOTE — ED PROVIDER NOTE - IV ALTEPLASE ADMIN OUTSIDE HIDDEN
Called and left a voicemail for patient to return call to schedule follow up visit. He is on the recall list.    show

## 2023-10-25 ENCOUNTER — APPOINTMENT (OUTPATIENT)
Dept: INTERNAL MEDICINE | Facility: CLINIC | Age: 23
End: 2023-10-25

## 2024-07-15 ENCOUNTER — APPOINTMENT (OUTPATIENT)
Dept: INTERNAL MEDICINE | Facility: CLINIC | Age: 24
End: 2024-07-15

## 2024-09-10 NOTE — ED PROVIDER NOTE - IV ALTEPASE ADMIN HIDDEN
Pt walked in and requested the following   disulfiram  goes over to the [address] [place] [address] [phone] [facility] pharmacy until everything gets worked out with home delivery as soon as possible please.     show

## 2025-05-30 NOTE — ED PROVIDER NOTE - NS HIV RISK FACTOR YES
Problem: Chronic Conditions and Co-morbidities  Goal: Patient's chronic conditions and co-morbidity symptoms are monitored and maintained or improved  Outcome: Progressing     Problem: Discharge Planning  Goal: Discharge to home or other facility with appropriate resources  Outcome: Progressing     Problem: Pain  Goal: Verbalizes/displays adequate comfort level or baseline comfort level  Outcome: Progressing      Declined